# Patient Record
Sex: MALE | Race: BLACK OR AFRICAN AMERICAN | NOT HISPANIC OR LATINO | URBAN - METROPOLITAN AREA
[De-identification: names, ages, dates, MRNs, and addresses within clinical notes are randomized per-mention and may not be internally consistent; named-entity substitution may affect disease eponyms.]

---

## 2019-02-05 ENCOUNTER — APPOINTMENT (RX ONLY)
Dept: URBAN - METROPOLITAN AREA CLINIC 349 | Facility: CLINIC | Age: 29
Setting detail: DERMATOLOGY
End: 2019-02-05

## 2019-02-05 DIAGNOSIS — L73.1 PSEUDOFOLLICULITIS BARBAE: ICD-10-CM

## 2019-02-05 PROCEDURE — 99202 OFFICE O/P NEW SF 15 MIN: CPT

## 2019-02-05 PROCEDURE — ? COUNSELING

## 2019-02-05 PROCEDURE — ? PRESCRIPTION

## 2019-02-05 PROCEDURE — ? TREATMENT REGIMEN

## 2019-02-05 RX ORDER — DESONIDE 0.5 MG/G
GEL TOPICAL
Qty: 1 | Refills: 2 | COMMUNITY
Start: 2019-02-05

## 2019-02-05 RX ADMIN — DESONIDE: 0.5 GEL TOPICAL at 17:35

## 2019-02-05 ASSESSMENT — LOCATION ZONE DERM: LOCATION ZONE: FACE

## 2019-02-05 ASSESSMENT — LOCATION DETAILED DESCRIPTION DERM
LOCATION DETAILED: LEFT INFERIOR CENTRAL MALAR CHEEK
LOCATION DETAILED: LEFT CHIN
LOCATION DETAILED: RIGHT INFERIOR CENTRAL MALAR CHEEK

## 2019-02-05 ASSESSMENT — LOCATION SIMPLE DESCRIPTION DERM
LOCATION SIMPLE: RIGHT CHEEK
LOCATION SIMPLE: CHIN
LOCATION SIMPLE: LEFT CHEEK

## 2019-02-05 NOTE — PROCEDURE: TREATMENT REGIMEN
Plan: Pt will also meet letter filled out for work
Detail Level: Zone
Initiate Treatment: Desonide gel to use on face after shaving